# Patient Record
Sex: MALE | Race: WHITE | NOT HISPANIC OR LATINO | Employment: FULL TIME | URBAN - NONMETROPOLITAN AREA
[De-identification: names, ages, dates, MRNs, and addresses within clinical notes are randomized per-mention and may not be internally consistent; named-entity substitution may affect disease eponyms.]

---

## 2019-04-23 ENCOUNTER — NON-PROVIDER VISIT (OUTPATIENT)
Dept: URGENT CARE | Facility: PHYSICIAN GROUP | Age: 39
End: 2019-04-23

## 2019-04-23 DIAGNOSIS — Z02.83 ENCOUNTER FOR DRUG SCREENING: ICD-10-CM

## 2019-04-23 PROCEDURE — 8907 PR URINE COLLECT ONLY: Performed by: EMERGENCY MEDICINE

## 2021-03-10 ENCOUNTER — HOSPITAL ENCOUNTER (OUTPATIENT)
Dept: LAB | Facility: MEDICAL CENTER | Age: 41
End: 2021-03-10
Payer: COMMERCIAL

## 2021-03-10 LAB
COVID ORDER STATUS COVID19: NORMAL
SARS-COV-2 RNA RESP QL NAA+PROBE: NOTDETECTED
SPECIMEN SOURCE: NORMAL

## 2021-09-20 ENCOUNTER — NON-PROVIDER VISIT (OUTPATIENT)
Dept: URGENT CARE | Facility: PHYSICIAN GROUP | Age: 41
End: 2021-09-20

## 2021-09-20 DIAGNOSIS — Z02.83 ENCOUNTER FOR DRUG SCREENING: ICD-10-CM

## 2021-09-20 PROCEDURE — 8907 PR URINE COLLECT ONLY: Performed by: FAMILY MEDICINE

## 2024-12-13 ENCOUNTER — HOSPITAL ENCOUNTER (OUTPATIENT)
Facility: MEDICAL CENTER | Age: 44
End: 2024-12-13
Payer: COMMERCIAL

## 2024-12-13 ENCOUNTER — OFFICE VISIT (OUTPATIENT)
Dept: URGENT CARE | Facility: PHYSICIAN GROUP | Age: 44
End: 2024-12-13
Payer: COMMERCIAL

## 2024-12-13 VITALS
RESPIRATION RATE: 16 BRPM | DIASTOLIC BLOOD PRESSURE: 88 MMHG | OXYGEN SATURATION: 96 % | TEMPERATURE: 97.6 F | SYSTOLIC BLOOD PRESSURE: 118 MMHG | HEART RATE: 104 BPM | WEIGHT: 254 LBS

## 2024-12-13 DIAGNOSIS — R23.8 SKIN IRRITATION: ICD-10-CM

## 2024-12-13 PROCEDURE — 87070 CULTURE OTHR SPECIMN AEROBIC: CPT

## 2024-12-13 PROCEDURE — 87076 CULTURE ANAEROBE IDENT EACH: CPT | Mod: 91

## 2024-12-13 PROCEDURE — 3079F DIAST BP 80-89 MM HG: CPT

## 2024-12-13 PROCEDURE — 87205 SMEAR GRAM STAIN: CPT

## 2024-12-13 PROCEDURE — 99213 OFFICE O/P EST LOW 20 MIN: CPT

## 2024-12-13 PROCEDURE — 3074F SYST BP LT 130 MM HG: CPT

## 2024-12-13 RX ORDER — CLOTRIMAZOLE 1 %
CREAM (GRAM) TOPICAL
Qty: 28 G | Refills: 0 | Status: SHIPPED | OUTPATIENT
Start: 2024-12-13

## 2024-12-13 ASSESSMENT — ENCOUNTER SYMPTOMS
FEVER: 0
CHILLS: 0

## 2024-12-13 NOTE — PROGRESS NOTES
"Subjective:   Kem Doyle is a 44 y.o. male who presents for Rash (Rash around groin- a year. Did see doctor for this previously)      HPI: Patient presents with complaints of penile \"rash\". Patient is unsure of how long these symptoms have been present. Patient reports he had similar symptoms about 2 years ago but does not recall specific diagnosis and was prescribed topical Clotrimazole which helped. Reports mild discomfort/irritation but no significant pain or itching. Denies history of prior STIs. Denies concerns for STIs at this time. No fevers/chills, penile discharge, ulcerations.    Review of Systems   Constitutional:  Negative for chills and fever.   Genitourinary:  Negative for dysuria, frequency and urgency.   Musculoskeletal:  Negative for joint pain.   Skin:  Negative for itching.   All other systems reviewed and are negative.      Allergies: Patient has no known allergies.    Problem List: Kem Doyle does not have any pertinent problems on file.    Surgical History:  Past Surgical History:   Procedure Laterality Date    ORIF, HAND         Past Social Hx: Kem Doyle  reports that he quit smoking about 16 years ago. He does not have any smokeless tobacco history on file. He reports that he does not drink alcohol and does not use drugs.     Past Family Hx:  Kem Doyle family history includes Diabetes in his paternal uncle; Heart Disease in his father and paternal uncle; Hypertension in his father.     Problem list, medications, and allergies reviewed by myself today in Epic.     Objective:     /88   Pulse (!) 104   Temp 36.4 °C (97.6 °F) (Temporal)   Resp 16   Wt 115 kg (254 lb)   SpO2 96%     Physical Exam  HENT:      Right Ear: External ear normal.      Left Ear: External ear normal.      Nose: Nose normal.   Cardiovascular:      Heart sounds: Normal heart sounds.   Pulmonary:      Effort: Pulmonary effort is normal.   Genitourinary:     Comments: Chaperone " present during exam. Under foreskin dry, cracked. No rash or ulcerations appreciated. Urethral opening reddened, non-tender.    Skin:     General: Skin is warm and dry.   Neurological:      General: No focal deficit present.      Mental Status: He is alert.       Assessment/Plan:     Diagnosis and associated orders:     1. Skin irritation  CULTURE WOUND W/ GRAM STAIN    clotrimazole (LOTRIMIN) 1 % Cream         Comments/MDM:     Suspect Balanitis/Candida etiology  Patient attests clotrimazole has worked well in the past, will represcribe  Culture pending  Provided education regarding careful cleansing of genitalia and avoidance of potential irritants  Patient is an honest historian but also a very poor historian. Shared decision making was utilized all throughout this visit. Patient is primarily requesting topical medication.  Return precautions advised  12/17 attempted to call patient regarding culture results - numbers provided by patient disconnected.      Differential diagnosis, natural history, supportive care, and indications for immediate follow-up discussed. Advised the patient to follow-up with PCP for recheck, reevaluation, and consideration of further management.    Claudia Epperson, CATHERINE, APRN, FNP-BC

## 2024-12-14 LAB
GRAM STN SPEC: NORMAL
SIGNIFICANT IND 70042: NORMAL
SITE SITE: NORMAL
SOURCE SOURCE: NORMAL

## 2024-12-15 ENCOUNTER — SUPERVISING PHYSICIAN REVIEW (OUTPATIENT)
Dept: URGENT CARE | Facility: PHYSICIAN GROUP | Age: 44
End: 2024-12-15
Payer: COMMERCIAL

## 2024-12-17 LAB
BACTERIA WND AEROBE CULT: NORMAL
GRAM STN SPEC: NORMAL
SIGNIFICANT IND 70042: NORMAL
SITE SITE: NORMAL
SOURCE SOURCE: NORMAL